# Patient Record
Sex: FEMALE | Race: OTHER | HISPANIC OR LATINO | URBAN - METROPOLITAN AREA
[De-identification: names, ages, dates, MRNs, and addresses within clinical notes are randomized per-mention and may not be internally consistent; named-entity substitution may affect disease eponyms.]

---

## 2017-03-17 ENCOUNTER — OUTPATIENT (OUTPATIENT)
Dept: OUTPATIENT SERVICES | Facility: HOSPITAL | Age: 52
LOS: 1 days | End: 2017-03-17
Payer: MEDICAID

## 2017-03-17 DIAGNOSIS — J45.30 MILD PERSISTENT ASTHMA, UNCOMPLICATED: ICD-10-CM

## 2017-03-17 PROCEDURE — 94060 EVALUATION OF WHEEZING: CPT

## 2017-03-17 PROCEDURE — 94729 DIFFUSING CAPACITY: CPT

## 2017-03-17 PROCEDURE — 94010 BREATHING CAPACITY TEST: CPT

## 2019-04-08 PROBLEM — Z00.00 ENCOUNTER FOR PREVENTIVE HEALTH EXAMINATION: Status: ACTIVE | Noted: 2019-04-08

## 2019-04-15 ENCOUNTER — APPOINTMENT (OUTPATIENT)
Dept: SURGERY | Facility: CLINIC | Age: 54
End: 2019-04-15
Payer: MEDICAID

## 2019-04-15 ENCOUNTER — APPOINTMENT (OUTPATIENT)
Dept: SURGERY | Facility: CLINIC | Age: 54
End: 2019-04-15

## 2019-04-15 VITALS
DIASTOLIC BLOOD PRESSURE: 67 MMHG | OXYGEN SATURATION: 96 % | HEIGHT: 59 IN | SYSTOLIC BLOOD PRESSURE: 103 MMHG | BODY MASS INDEX: 33.67 KG/M2 | TEMPERATURE: 98.1 F | HEART RATE: 55 BPM | WEIGHT: 167 LBS

## 2019-04-15 PROCEDURE — 99203 OFFICE O/P NEW LOW 30 MIN: CPT

## 2019-05-20 ENCOUNTER — APPOINTMENT (OUTPATIENT)
Dept: SURGERY | Facility: HOSPITAL | Age: 54
End: 2019-05-20

## 2019-05-23 ENCOUNTER — OUTPATIENT (OUTPATIENT)
Dept: OUTPATIENT SERVICES | Facility: HOSPITAL | Age: 54
LOS: 1 days | End: 2019-05-23
Payer: MEDICAID

## 2019-05-23 VITALS
HEIGHT: 59 IN | DIASTOLIC BLOOD PRESSURE: 77 MMHG | HEART RATE: 70 BPM | OXYGEN SATURATION: 97 % | TEMPERATURE: 98 F | WEIGHT: 171.96 LBS | RESPIRATION RATE: 18 BRPM | SYSTOLIC BLOOD PRESSURE: 112 MMHG

## 2019-05-23 DIAGNOSIS — H91.91 UNSPECIFIED HEARING LOSS, RIGHT EAR: ICD-10-CM

## 2019-05-23 DIAGNOSIS — K80.20 CALCULUS OF GALLBLADDER WITHOUT CHOLECYSTITIS WITHOUT OBSTRUCTION: ICD-10-CM

## 2019-05-23 DIAGNOSIS — J45.909 UNSPECIFIED ASTHMA, UNCOMPLICATED: ICD-10-CM

## 2019-05-23 DIAGNOSIS — Z01.818 ENCOUNTER FOR OTHER PREPROCEDURAL EXAMINATION: ICD-10-CM

## 2019-05-23 DIAGNOSIS — Z98.891 HISTORY OF UTERINE SCAR FROM PREVIOUS SURGERY: Chronic | ICD-10-CM

## 2019-05-23 DIAGNOSIS — K81.9 CHOLECYSTITIS, UNSPECIFIED: ICD-10-CM

## 2019-05-23 DIAGNOSIS — E78.5 HYPERLIPIDEMIA, UNSPECIFIED: ICD-10-CM

## 2019-05-23 DIAGNOSIS — K29.70 GASTRITIS, UNSPECIFIED, WITHOUT BLEEDING: ICD-10-CM

## 2019-05-23 LAB — BLD GP AB SCN SERPL QL: SIGNIFICANT CHANGE UP

## 2019-05-23 PROCEDURE — 86850 RBC ANTIBODY SCREEN: CPT

## 2019-05-23 PROCEDURE — 86900 BLOOD TYPING SEROLOGIC ABO: CPT

## 2019-05-23 PROCEDURE — G0463: CPT

## 2019-05-23 PROCEDURE — 36415 COLL VENOUS BLD VENIPUNCTURE: CPT

## 2019-05-23 PROCEDURE — 86901 BLOOD TYPING SEROLOGIC RH(D): CPT

## 2019-05-23 NOTE — H&P PST ADULT - RESPIRATORY AND THORAX COMMENTS
asthma-last attack in 2011-never hospitalized asthma-last attack in 2011-never hospitalized, never intubated

## 2019-05-23 NOTE — H&P PST ADULT - NEGATIVE CARDIOVASCULAR SYMPTOMS
no palpitations/no orthopnea/no paroxysmal nocturnal dyspnea/no peripheral edema/no claudication/no chest pain/no dyspnea on exertion

## 2019-05-23 NOTE — H&P PST ADULT - HISTORY OF PRESENT ILLNESS
53 yr old female with PMH of  asthma, gastritis presents with c/o intermittent abdominal pain due to gallstones. Pt reports worsening of pain after ingestion of meals. Pt for laparoscopic cholecystectomy with intraoperative cholangiogram, possible open on 5/31/2019. 53 yr old female with PMH of  asthma, gastritis, Hyperlipidemia-diet controlled, hearing deficit of right ear presents with c/o intermittent abdominal pain due to gallstones. Pt reports worsening of pain after ingestion of meals. Pt for laparoscopic cholecystectomy with intraoperative cholangiogram, possible open on 5/31/2019.

## 2019-05-23 NOTE — H&P PST ADULT - NEGATIVE GENERAL GENITOURINARY SYMPTOMS
no bladder infections/no urinary hesitancy/no dysuria/no incontinence/normal urinary frequency/no nocturia

## 2019-05-23 NOTE — H&P PST ADULT - ASSESSMENT
53 yr old female with PMH of  asthma, gastritis, Hyperlipidemia-diet controlled, hearing deficit of right ear presents with cholelithiasis. Pt for laparoscopic cholecystectomy with intraoperative cholangiogram, possible open on 5/31/2019.

## 2019-05-23 NOTE — H&P PST ADULT - NSICDXPASTMEDICALHX_GEN_ALL_CORE_FT
PAST MEDICAL HISTORY:  Asthma     Cholelithiasis     Gastritis PAST MEDICAL HISTORY:  Asthma     Cholelithiasis     Dryness of eye     Gastritis     Hearing deficit, right     Hyperlipidemia diet controlled

## 2019-05-23 NOTE — H&P PST ADULT - NSICDXPROBLEM_GEN_ALL_CORE_FT
PROBLEM DIAGNOSES  Problem: Hearing deficit, right  Assessment and Plan: Perioperative safety measures.    Problem: Asthma  Assessment and Plan: Continue use of inhalers and use on day of surgery. Follow-up by PCP for asthmatic management.    Problem: Gastritis  Assessment and Plan: Continue Pantoprazole and take with sips of water on day of surgery. Follow-up with PCP postop for management.    Problem: HLD (hyperlipidemia)  Assessment and Plan: Lifestyle modifications encouraged. Follow-up with PCP postop for lipid management    Problem: Cholelithiasis  Assessment and Plan: Laparoscopic cholecystectomy with intraoperative cholangiogram, possible open on 5/31/2019. Preoperative instructions given via [place] . Verbalized understanding.

## 2019-05-23 NOTE — H&P PST ADULT - RS GEN PE MLT RESP DETAILS PC
clear to auscultation bilaterally/no rhonchi/no wheezes/no subcutaneous emphysema/respirations non-labored/breath sounds equal/normal/airway patent/good air movement/no intercostal retractions/no rales/no chest wall tenderness

## 2019-05-23 NOTE — H&P PST ADULT - NEGATIVE GASTROINTESTINAL SYMPTOMS
no flatulence/no diarrhea/no vomiting/no constipation/no change in bowel habits/no melena/no nausea no

## 2019-05-23 NOTE — H&P PST ADULT - NEGATIVE OPHTHALMOLOGIC SYMPTOMS
no blurred vision L/no blurred vision R/no discharge R/no discharge L
[FreeTextEntry1] : Patient in Follow up visit was seem by GI has endoscopy next week, placed on PPI and H2 NEEDs labs for DM HTN HLD and depression compliant of Meds \par Care plan and diet reviewed  Meds done and diabetic care plan reviewed  rtc 2 week

## 2019-05-23 NOTE — H&P PST ADULT - NEGATIVE NEUROLOGICAL SYMPTOMS
no headache/no generalized seizures/no vertigo/no loss of sensation/no difficulty walking/no focal seizures/no syncope/no tremors

## 2019-05-30 ENCOUNTER — TRANSCRIPTION ENCOUNTER (OUTPATIENT)
Age: 54
End: 2019-05-30

## 2019-05-31 ENCOUNTER — RESULT REVIEW (OUTPATIENT)
Age: 54
End: 2019-05-31

## 2019-05-31 ENCOUNTER — OUTPATIENT (OUTPATIENT)
Dept: OUTPATIENT SERVICES | Facility: HOSPITAL | Age: 54
LOS: 1 days | End: 2019-05-31
Payer: MEDICAID

## 2019-05-31 VITALS
TEMPERATURE: 98 F | DIASTOLIC BLOOD PRESSURE: 60 MMHG | HEART RATE: 56 BPM | OXYGEN SATURATION: 97 % | HEIGHT: 59 IN | RESPIRATION RATE: 16 BRPM | WEIGHT: 171.96 LBS | SYSTOLIC BLOOD PRESSURE: 102 MMHG

## 2019-05-31 VITALS
SYSTOLIC BLOOD PRESSURE: 137 MMHG | OXYGEN SATURATION: 96 % | TEMPERATURE: 98 F | HEART RATE: 67 BPM | RESPIRATION RATE: 16 BRPM | DIASTOLIC BLOOD PRESSURE: 72 MMHG

## 2019-05-31 DIAGNOSIS — Z01.818 ENCOUNTER FOR OTHER PREPROCEDURAL EXAMINATION: ICD-10-CM

## 2019-05-31 DIAGNOSIS — Z98.891 HISTORY OF UTERINE SCAR FROM PREVIOUS SURGERY: Chronic | ICD-10-CM

## 2019-05-31 DIAGNOSIS — K81.9 CHOLECYSTITIS, UNSPECIFIED: ICD-10-CM

## 2019-05-31 LAB — ALLERGY+IMMUNOLOGY DIAG STUDY NOTE: SIGNIFICANT CHANGE UP

## 2019-05-31 PROCEDURE — 47563 LAPARO CHOLECYSTECTOMY/GRAPH: CPT

## 2019-05-31 PROCEDURE — 86901 BLOOD TYPING SEROLOGIC RH(D): CPT

## 2019-05-31 PROCEDURE — 76000 FLUOROSCOPY <1 HR PHYS/QHP: CPT

## 2019-05-31 PROCEDURE — 36415 COLL VENOUS BLD VENIPUNCTURE: CPT

## 2019-05-31 PROCEDURE — C1889: CPT

## 2019-05-31 PROCEDURE — 86850 RBC ANTIBODY SCREEN: CPT

## 2019-05-31 PROCEDURE — 86900 BLOOD TYPING SEROLOGIC ABO: CPT

## 2019-05-31 PROCEDURE — 47563 LAPARO CHOLECYSTECTOMY/GRAPH: CPT | Mod: AS

## 2019-05-31 RX ORDER — ALBUTEROL 90 UG/1
2 AEROSOL, METERED ORAL
Qty: 0 | Refills: 0 | DISCHARGE

## 2019-05-31 RX ORDER — HYDROMORPHONE HYDROCHLORIDE 2 MG/ML
1 INJECTION INTRAMUSCULAR; INTRAVENOUS; SUBCUTANEOUS
Refills: 0 | Status: DISCONTINUED | OUTPATIENT
Start: 2019-05-31 | End: 2019-05-31

## 2019-05-31 RX ORDER — OXYCODONE AND ACETAMINOPHEN 5; 325 MG/1; MG/1
1 TABLET ORAL EVERY 4 HOURS
Refills: 0 | Status: DISCONTINUED | OUTPATIENT
Start: 2019-05-31 | End: 2019-05-31

## 2019-05-31 RX ORDER — BUDESONIDE AND FORMOTEROL FUMARATE DIHYDRATE 160; 4.5 UG/1; UG/1
2 AEROSOL RESPIRATORY (INHALATION)
Qty: 0 | Refills: 0 | DISCHARGE

## 2019-05-31 RX ORDER — PANTOPRAZOLE SODIUM 20 MG/1
1 TABLET, DELAYED RELEASE ORAL
Qty: 0 | Refills: 0 | DISCHARGE

## 2019-05-31 RX ORDER — FLUTICASONE FUROATE AND VILANTEROL TRIFENATATE 100; 25 UG/1; UG/1
1 POWDER RESPIRATORY (INHALATION)
Qty: 0 | Refills: 0 | DISCHARGE

## 2019-05-31 RX ORDER — SODIUM CHLORIDE 9 MG/ML
3 INJECTION INTRAMUSCULAR; INTRAVENOUS; SUBCUTANEOUS EVERY 8 HOURS
Refills: 0 | Status: DISCONTINUED | OUTPATIENT
Start: 2019-05-31 | End: 2019-05-31

## 2019-05-31 RX ORDER — SODIUM CHLORIDE 9 MG/ML
1000 INJECTION, SOLUTION INTRAVENOUS
Refills: 0 | Status: DISCONTINUED | OUTPATIENT
Start: 2019-05-31 | End: 2019-06-08

## 2019-05-31 RX ORDER — ONDANSETRON 8 MG/1
4 TABLET, FILM COATED ORAL ONCE
Refills: 0 | Status: DISCONTINUED | OUTPATIENT
Start: 2019-05-31 | End: 2019-05-31

## 2019-05-31 RX ORDER — SODIUM CHLORIDE 9 MG/ML
1000 INJECTION, SOLUTION INTRAVENOUS
Refills: 0 | Status: DISCONTINUED | OUTPATIENT
Start: 2019-05-31 | End: 2019-05-31

## 2019-05-31 RX ORDER — HYDROMORPHONE HYDROCHLORIDE 2 MG/ML
0.5 INJECTION INTRAMUSCULAR; INTRAVENOUS; SUBCUTANEOUS
Refills: 0 | Status: DISCONTINUED | OUTPATIENT
Start: 2019-05-31 | End: 2019-05-31

## 2019-05-31 RX ADMIN — HYDROMORPHONE HYDROCHLORIDE 0.5 MILLIGRAM(S): 2 INJECTION INTRAMUSCULAR; INTRAVENOUS; SUBCUTANEOUS at 10:44

## 2019-05-31 RX ADMIN — HYDROMORPHONE HYDROCHLORIDE 0.5 MILLIGRAM(S): 2 INJECTION INTRAMUSCULAR; INTRAVENOUS; SUBCUTANEOUS at 10:33

## 2019-05-31 RX ADMIN — SODIUM CHLORIDE 3 MILLILITER(S): 9 INJECTION INTRAMUSCULAR; INTRAVENOUS; SUBCUTANEOUS at 07:52

## 2019-05-31 RX ADMIN — SODIUM CHLORIDE 125 MILLILITER(S): 9 INJECTION, SOLUTION INTRAVENOUS at 11:01

## 2019-05-31 NOTE — ASU PATIENT PROFILE, ADULT - ABILITY TO HEAR (WITH HEARING AID OR HEARING APPLIANCE IF NORMALLY USED):
right ear hard of hearing/Mildly to Moderately Impaired: difficulty hearing in some environments or speaker may need to increase volume or speak distinctly

## 2019-05-31 NOTE — ASU DISCHARGE PLAN (ADULT/PEDIATRIC) - NO HEAVY LIFTING DURATION
for 1-2 weeks No heavy lifting or strenuous activity. Nothing heavier than 10- 15 lbs for 6 -8 weeks

## 2019-05-31 NOTE — ASU PATIENT PROFILE, ADULT - LANGUAGE ASSISTANCE NEEDED
No-Patient/Caregiver offered and refused free interpretation services./pt. stated  she "speaks some english too"

## 2019-05-31 NOTE — ASU DISCHARGE PLAN (ADULT/PEDIATRIC) - CARE PROVIDER_API CALL
Beck Dewey)  Surgery  [address] [facility], [place] [address]  Phone: [phone]  Fax: [phone]  Follow Up Time:

## 2019-05-31 NOTE — ASU PATIENT PROFILE, ADULT - PMH
Asthma    Cholelithiasis    Dryness of eye    Gastritis    Hearing deficit, right    Hyperlipidemia  diet controlled

## 2019-06-04 PROBLEM — K29.70 GASTRITIS, UNSPECIFIED, WITHOUT BLEEDING: Chronic | Status: ACTIVE | Noted: 2019-05-23

## 2019-06-04 PROBLEM — H91.91 UNSPECIFIED HEARING LOSS, RIGHT EAR: Chronic | Status: ACTIVE | Noted: 2019-05-23

## 2019-06-04 PROBLEM — J45.909 UNSPECIFIED ASTHMA, UNCOMPLICATED: Chronic | Status: ACTIVE | Noted: 2019-05-23

## 2019-06-04 PROBLEM — H04.129 DRY EYE SYNDROME OF UNSPECIFIED LACRIMAL GLAND: Chronic | Status: ACTIVE | Noted: 2019-05-23

## 2019-06-04 PROBLEM — E78.5 HYPERLIPIDEMIA, UNSPECIFIED: Chronic | Status: ACTIVE | Noted: 2019-05-23

## 2019-06-04 PROBLEM — K80.20 CALCULUS OF GALLBLADDER WITHOUT CHOLECYSTITIS WITHOUT OBSTRUCTION: Chronic | Status: ACTIVE | Noted: 2019-05-23

## 2019-06-04 LAB — SURGICAL PATHOLOGY STUDY: SIGNIFICANT CHANGE UP

## 2019-06-06 PROBLEM — Z86.69 HISTORY OF HEARING PROBLEM: Status: RESOLVED | Noted: 2019-06-06 | Resolved: 2019-06-06

## 2019-06-06 PROBLEM — Z87.19 HISTORY OF GASTRITIS: Status: RESOLVED | Noted: 2019-06-06 | Resolved: 2019-06-06

## 2019-06-06 PROBLEM — Z82.49 FAMILY HISTORY OF ESSENTIAL HYPERTENSION: Status: ACTIVE | Noted: 2019-06-06

## 2019-06-06 PROBLEM — Z78.9 NON-SMOKER: Status: ACTIVE | Noted: 2019-06-06

## 2019-06-06 PROBLEM — Z56.0 UNEMPLOYED: Status: ACTIVE | Noted: 2019-06-06

## 2019-06-06 PROBLEM — Z82.0 FAMILY HISTORY OF ALZHEIMER'S DISEASE: Status: ACTIVE | Noted: 2019-06-06

## 2019-06-06 PROBLEM — K81.9 CHOLECYSTITIS: Status: ACTIVE | Noted: 2019-06-06

## 2019-06-06 PROBLEM — Z86.39 HISTORY OF HIGH CHOLESTEROL: Status: RESOLVED | Noted: 2019-06-06 | Resolved: 2019-06-06

## 2019-06-06 PROBLEM — Z87.09 HISTORY OF ASTHMA: Status: RESOLVED | Noted: 2019-06-06 | Resolved: 2019-06-06

## 2019-06-13 ENCOUNTER — APPOINTMENT (OUTPATIENT)
Dept: SURGERY | Facility: CLINIC | Age: 54
End: 2019-06-13
Payer: MEDICAID

## 2019-06-13 DIAGNOSIS — Z86.39 PERSONAL HISTORY OF OTHER ENDOCRINE, NUTRITIONAL AND METABOLIC DISEASE: ICD-10-CM

## 2019-06-13 DIAGNOSIS — K81.9 CHOLECYSTITIS, UNSPECIFIED: ICD-10-CM

## 2019-06-13 DIAGNOSIS — Z86.69 PERSONAL HISTORY OF OTHER DISEASES OF THE NERVOUS SYSTEM AND SENSE ORGANS: ICD-10-CM

## 2019-06-13 DIAGNOSIS — Z78.9 OTHER SPECIFIED HEALTH STATUS: ICD-10-CM

## 2019-06-13 DIAGNOSIS — Z56.0 UNEMPLOYMENT, UNSPECIFIED: ICD-10-CM

## 2019-06-13 DIAGNOSIS — Z82.0 FAMILY HISTORY OF EPILEPSY AND OTHER DISEASES OF THE NERVOUS SYSTEM: ICD-10-CM

## 2019-06-13 DIAGNOSIS — Z87.19 PERSONAL HISTORY OF OTHER DISEASES OF THE DIGESTIVE SYSTEM: ICD-10-CM

## 2019-06-13 DIAGNOSIS — Z82.49 FAMILY HISTORY OF ISCHEMIC HEART DISEASE AND OTHER DISEASES OF THE CIRCULATORY SYSTEM: ICD-10-CM

## 2019-06-13 DIAGNOSIS — Z87.09 PERSONAL HISTORY OF OTHER DISEASES OF THE RESPIRATORY SYSTEM: ICD-10-CM

## 2019-06-13 PROCEDURE — 99024 POSTOP FOLLOW-UP VISIT: CPT

## 2019-06-13 SDOH — ECONOMIC STABILITY - INCOME SECURITY: UNEMPLOYMENT, UNSPECIFIED: Z56.0

## 2019-06-13 NOTE — DATA REVIEWED
[FreeTextEntry1] : JUAN CARLOS UPTON                       1\par \par \par \par Surgical Final Report\par \par \par \par \par Final Diagnosis\par Gallbladder, cholecystectomy: Chronic calculus cholecystitis\par \par Verified by: Nica Casey M.D.\par (Electronic Signature)\par Reported on: 06/04/19 14:01 EDT, [address] [place] [address], [place],\[place] [address]\par _________________________________________________________________\par \par Clinical History\par Cholecystitis\par Laparoscopic cholecystectomy

## 2019-06-13 NOTE — ASSESSMENT
[FreeTextEntry1] : Patient is doing well, with excellent post-operative recovery. All surgical incisions are healing well and as expected. There is no evidence of infection or complication, and patient is progressing as expected. Post-operative wound care, activity, restrictions and precautions reinforced. path discussed.  Patient's questions and concerns addressed to patient's satisfaction.\par

## 2019-06-13 NOTE — HISTORY OF PRESENT ILLNESS
[de-identified] : Patient is s/p Laparoscopic cholecystectomy with cholangiography  on 05/31/2019. Today patient offers no complaints. patient reports no fever, chills,  or  pain.  Surgical wounds are  healing well. No signs of inflammation, infection or exudate. Patient reports good bowel movements and appetite.

## 2019-06-13 NOTE — PHYSICAL EXAM
[de-identified] : The patient is alert, well-groomed, and cheerful. [de-identified] : Surgical wounds are  healing well.   no signs of  inflammation or infection.

## 2020-10-15 NOTE — H&P PST ADULT - VENOUS THROMBOEMBOLISM HISTORY
Speech Language Pathology      Fatou Lorenzo  MRN: [id number]    Patient not seen today secondary to transfer to higher level of care. Please re-consult SLP services when appropriate.    SJ iLm, CCC-SLP  10/15/2020     (0) indicator not present

## 2021-02-26 NOTE — ASU PATIENT PROFILE, ADULT - BILL OF RIGHTS/ADMISSION INFORMATION PROVIDED TO:
To get better and follow your care plan as instructed. To get better and follow your care plan as instructed.  Pt. will remain free from chest pain Patient

## 2021-07-12 NOTE — H&P PST ADULT - BLOOD AVOIDANCE/RESTRICTIONS, PROFILE
I have not seen the patient before.  Please check with another ID provider who had prescribed antibiotic.   none